# Patient Record
(demographics unavailable — no encounter records)

---

## 2024-12-10 NOTE — HISTORY OF PRESENT ILLNESS
[FreeTextEntry1] : CPE  [de-identified] : This 52-year-old past medical history significant for hypothyroidism and iron deficiency anemia presenting for an annual physical.  She is status post endoscopy for iron deficiency anemia.  Her colonoscopy is up to date.  Had mammogram earlier this year.  She is unsure when her last Pap smear was.  No acute concerns

## 2025-03-28 NOTE — CONSULT LETTER
[Dear  ___] : Dear  [unfilled], [Courtesy Letter:] : I had the pleasure of seeing your patient, [unfilled], in my office today. [Please see my note below.] : Please see my note below. [Consult Closing:] : Thank you very much for allowing me to participate in the care of this patient.  If you have any questions, please do not hesitate to contact me. [Sincerely,] : Sincerely, [FreeTextEntry1] : I will keep you informed of the BRCA results. [FreeTextEntry3] : Yoel Segura MD FACS Chief of Surgical Oncology

## 2025-03-28 NOTE — PHYSICAL EXAM
[Normal] : supple, no neck mass and thyroid not enlarged [Normal Supraclavicular Lymph Nodes] : normal supraclavicular lymph nodes [Normal Axillary Lymph Nodes] : normal axillary lymph nodes [Normal] : oriented to person, place and time, with appropriate affect [de-identified] : fibrocystic changes but no masses

## 2025-03-28 NOTE — HISTORY OF PRESENT ILLNESS
[de-identified] : Ms. CAROLYN MICHAEL is a 52-year-old woman, last seen in , here for a follow-up visit.   Initial visit in 2022 where she reported intermittent right lateral breast pain for 2-3y, worse for 2 weeks and hurts while laying on right side.  limited caffeine intake. prior breast cyst aspiration  ago by Dr. Walls  PMH/PSH unremarkable  FH: denies breast/ ovarian cancer in family, father -  from angiosarcoma  B/L mammo/sono 2023: - right clustered cysts periareolar 2:00 5 x 3 x 5 mm - right 8:00 obligated cyst 4 cm fn, 8 mm - right cyst 9:00 5 cm fn, 5 mm - left cyst 12:00 1 cm fn, 3 mm BI-RADS 2   2023 - reports pain is worse around menses, cycles are still regular, pain improves once cycle is done   B/L mammo/sono 2024 - BIRADS 2  B/L mammo/sono  2025 - BIRADS 2  Her maternal cousin was just diagnosed with breast cancer and is BRCA mutated

## 2025-03-28 NOTE — HISTORY OF PRESENT ILLNESS
[de-identified] : Ms. CAROLYN MICHAEL is a 52-year-old woman, last seen in , here for a follow-up visit.   Initial visit in 2022 where she reported intermittent right lateral breast pain for 2-3y, worse for 2 weeks and hurts while laying on right side.  limited caffeine intake. prior breast cyst aspiration  ago by Dr. Walls  PMH/PSH unremarkable  FH: denies breast/ ovarian cancer in family, father -  from angiosarcoma  B/L mammo/sono 2023: - right clustered cysts periareolar 2:00 5 x 3 x 5 mm - right 8:00 obligated cyst 4 cm fn, 8 mm - right cyst 9:00 5 cm fn, 5 mm - left cyst 12:00 1 cm fn, 3 mm BI-RADS 2   2023 - reports pain is worse around menses, cycles are still regular, pain improves once cycle is done   B/L mammo/sono 2024 - BIRADS 2  B/L mammo/sono  2025 - BIRADS 2  Her maternal cousin was just diagnosed with breast cancer and is BRCA mutated

## 2025-03-28 NOTE — ASSESSMENT
[FreeTextEntry1] : IMP: 51yo F w/ right breast tenderness - resolves w/ menstrual cycles (still regular)  B/L mammo/sono 2/2025 - BIRADS 2   1st cousin recently found to have invasive breast cancer and BRCA positive -  pt was advised to get tested as well  PLAN:  genetics sent today B/L mammo/sono 2/2026 RTO yearly

## 2025-03-28 NOTE — CONSULT LETTER
[Dear  ___] : Dear  [unfilled], [Courtesy Letter:] : I had the pleasure of seeing your patient, [unfilled], in my office today. [Please see my note below.] : Please see my note below. [Consult Closing:] : Thank you very much for allowing me to participate in the care of this patient.  If you have any questions, please do not hesitate to contact me. [Sincerely,] : Sincerely, [FreeTextEntry1] : I will keep you informed of the BRCA results. [FreeTextEntry3] : Yeol Segura MD FACS Chief of Surgical Oncology

## 2025-03-28 NOTE — PHYSICAL EXAM
[Normal] : supple, no neck mass and thyroid not enlarged [Normal Supraclavicular Lymph Nodes] : normal supraclavicular lymph nodes [Normal Axillary Lymph Nodes] : normal axillary lymph nodes [Normal] : oriented to person, place and time, with appropriate affect [de-identified] : fibrocystic changes but no masses

## 2025-03-28 NOTE — ASSESSMENT
[FreeTextEntry1] : IMP: 53yo F w/ right breast tenderness - resolves w/ menstrual cycles (still regular)  B/L mammo/sono 2/2025 - BIRADS 2   1st cousin recently found to have invasive breast cancer and BRCA positive -  pt was advised to get tested as well  PLAN:  genetics sent today B/L mammo/sono 2/2026 RTO yearly